# Patient Record
Sex: FEMALE | Race: WHITE | NOT HISPANIC OR LATINO | Employment: FULL TIME | URBAN - METROPOLITAN AREA
[De-identification: names, ages, dates, MRNs, and addresses within clinical notes are randomized per-mention and may not be internally consistent; named-entity substitution may affect disease eponyms.]

---

## 2017-11-16 ENCOUNTER — APPOINTMENT (OUTPATIENT)
Dept: PREADMISSION TESTING | Facility: HOSPITAL | Age: 57
End: 2017-11-16

## 2017-11-16 VITALS
SYSTOLIC BLOOD PRESSURE: 123 MMHG | HEIGHT: 69 IN | HEART RATE: 73 BPM | RESPIRATION RATE: 20 BRPM | TEMPERATURE: 98.4 F | DIASTOLIC BLOOD PRESSURE: 78 MMHG | OXYGEN SATURATION: 99 % | BODY MASS INDEX: 23.55 KG/M2 | WEIGHT: 159 LBS

## 2017-11-16 LAB
ALBUMIN SERPL-MCNC: 4.1 G/DL (ref 3.5–5.2)
ALBUMIN/GLOB SERPL: 1.5 G/DL
ALP SERPL-CCNC: 63 U/L (ref 39–117)
ALT SERPL W P-5'-P-CCNC: 21 U/L (ref 1–33)
ANION GAP SERPL CALCULATED.3IONS-SCNC: 9.6 MMOL/L
AST SERPL-CCNC: 20 U/L (ref 1–32)
BASOPHILS # BLD AUTO: 0.04 10*3/MM3 (ref 0–0.2)
BASOPHILS NFR BLD AUTO: 0.8 % (ref 0–1.5)
BILIRUB SERPL-MCNC: 0.5 MG/DL (ref 0.1–1.2)
BUN BLD-MCNC: 26 MG/DL (ref 6–20)
BUN/CREAT SERPL: 38.2 (ref 7–25)
CALCIUM SPEC-SCNC: 9.1 MG/DL (ref 8.6–10.5)
CHLORIDE SERPL-SCNC: 105 MMOL/L (ref 98–107)
CO2 SERPL-SCNC: 28.4 MMOL/L (ref 22–29)
CREAT BLD-MCNC: 0.68 MG/DL (ref 0.57–1)
DEPRECATED RDW RBC AUTO: 41.5 FL (ref 37–54)
EOSINOPHIL # BLD AUTO: 0.06 10*3/MM3 (ref 0–0.7)
EOSINOPHIL NFR BLD AUTO: 1.3 % (ref 0.3–6.2)
ERYTHROCYTE [DISTWIDTH] IN BLOOD BY AUTOMATED COUNT: 12.8 % (ref 11.7–13)
GFR SERPL CREATININE-BSD FRML MDRD: 89 ML/MIN/1.73
GLOBULIN UR ELPH-MCNC: 2.7 GM/DL
GLUCOSE BLD-MCNC: 72 MG/DL (ref 65–99)
HCT VFR BLD AUTO: 41 % (ref 35.6–45.5)
HGB BLD-MCNC: 13.8 G/DL (ref 11.9–15.5)
IMM GRANULOCYTES # BLD: 0.02 10*3/MM3 (ref 0–0.03)
IMM GRANULOCYTES NFR BLD: 0.4 % (ref 0–0.5)
LYMPHOCYTES # BLD AUTO: 1.17 10*3/MM3 (ref 0.9–4.8)
LYMPHOCYTES NFR BLD AUTO: 24.6 % (ref 19.6–45.3)
MCH RBC QN AUTO: 29.8 PG (ref 26.9–32)
MCHC RBC AUTO-ENTMCNC: 33.7 G/DL (ref 32.4–36.3)
MCV RBC AUTO: 88.6 FL (ref 80.5–98.2)
MONOCYTES # BLD AUTO: 0.23 10*3/MM3 (ref 0.2–1.2)
MONOCYTES NFR BLD AUTO: 4.8 % (ref 5–12)
NEUTROPHILS # BLD AUTO: 3.23 10*3/MM3 (ref 1.9–8.1)
NEUTROPHILS NFR BLD AUTO: 68.1 % (ref 42.7–76)
PLATELET # BLD AUTO: 205 10*3/MM3 (ref 140–500)
PMV BLD AUTO: 9.2 FL (ref 6–12)
POTASSIUM BLD-SCNC: 4.2 MMOL/L (ref 3.5–5.2)
PROT SERPL-MCNC: 6.8 G/DL (ref 6–8.5)
RBC # BLD AUTO: 4.63 10*6/MM3 (ref 3.9–5.2)
SODIUM BLD-SCNC: 143 MMOL/L (ref 136–145)
WBC NRBC COR # BLD: 4.75 10*3/MM3 (ref 4.5–10.7)

## 2017-11-16 PROCEDURE — 93010 ELECTROCARDIOGRAM REPORT: CPT | Performed by: INTERNAL MEDICINE

## 2017-11-16 PROCEDURE — 80053 COMPREHEN METABOLIC PANEL: CPT | Performed by: ORTHOPAEDIC SURGERY

## 2017-11-16 PROCEDURE — 36415 COLL VENOUS BLD VENIPUNCTURE: CPT

## 2017-11-16 PROCEDURE — 85025 COMPLETE CBC W/AUTO DIFF WBC: CPT | Performed by: ORTHOPAEDIC SURGERY

## 2017-11-16 PROCEDURE — 93005 ELECTROCARDIOGRAM TRACING: CPT

## 2017-11-16 RX ORDER — MONTELUKAST SODIUM 10 MG/1
10 TABLET ORAL EVERY MORNING
COMMUNITY

## 2017-11-16 RX ORDER — TRIAMCINOLONE ACETONIDE 55 UG/1
2 SPRAY, METERED NASAL EVERY MORNING
COMMUNITY

## 2017-11-16 RX ORDER — LEVOTHYROXINE SODIUM 0.03 MG/1
25 TABLET ORAL EVERY MORNING
COMMUNITY

## 2017-11-16 RX ORDER — MELOXICAM 15 MG/1
15 TABLET ORAL EVERY MORNING
COMMUNITY

## 2017-11-16 RX ORDER — ERGOCALCIFEROL 1.25 MG/1
50000 CAPSULE ORAL 2 TIMES WEEKLY
COMMUNITY

## 2017-11-16 NOTE — DISCHARGE INSTRUCTIONS
Take the following medications the morning of surgery with a small sip of water:  none        General Instructions:  • Do not eat solid food after midnight the night before surgery.  • You may drink clear liquids day of surgery but must stop at least one hour before your hospital arrival time.  • It is beneficial for you to have a clear drink that contains carbohydrates the day of surgery.  We suggest a 12 to 20 ounce bottle of Gatorade or Powerade for non-diabetic patients or a 12 to 20 ounce bottle of G2 or Powerade Zero for diabetic patients. (Pediatric patients, are not advised to drink a 12 to 20 ounce carbohydrate drink)    Clear liquids are liquids you can see through.  Nothing red in color.     Plain water                               Sports drinks  Sodas                                   Gelatin (Jell-O)  Fruit juices without pulp such as white grape juice and apple juice  Popsicles that contain no fruit or yogurt  Tea or coffee (no cream or milk added)  Gatorade / Powerade  G2 / Powerade Zero    • Infants may have breast milk up to four hours before surgery.  • Infants drinking formula may drink formula up to six hours before surgery.   • Patients who avoid smoking, chewing tobacco and alcohol for 4 weeks prior to surgery have a reduced risk of post-operative complications.  Quit smoking as many days before surgery as you can.  • Do not smoke, use chewing tobacco or drink alcohol the day of surgery.   • If applicable bring your C-PAP/ BI-PAP machine.  • Bring any papers given to you in the doctor’s office.  • Wear clean comfortable clothes and socks.  • Do not wear contact lenses or make-up.  Bring a case for your glasses.   • Bring crutches or walker if applicable.  • Remove all piercings.  Leave jewelry and any other valuables at home.  • The Pre-Admission Testing nurse will instruct you to bring medications if unable to obtain an accurate list in Pre-Admission Testing.        If you were given a blood  bank ID arm band remember to bring it with you the day of surgery.    Preventing a Surgical Site Infection:  • For 2 to 3 days before surgery, avoid shaving with a razor because the razor can irritate skin and make it easier to develop an infection.  • The night prior to surgery sleep in a clean bed with clean clothing.  Do not allow pets to sleep with you.  • Shower on the morning of surgery using a fresh bar of anti-bacterial soap (such as Dial) and clean washcloth.  Dry with a clean towel and dress in clean clothing.  • Ask your surgeon if you will be receiving antibiotics prior to surgery.  • Make sure you, your family, and all healthcare providers clean their hands with soap and water or an alcohol based hand  before caring for you or your wound.    Day of surgery:  Upon arrival, a Pre-op nurse and Anesthesiologist will review your health history, obtain vital signs, and answer questions you may have.  The only belongings needed at this time will be your home medications and if applicable your C-PAP/BI-PAP machine.  If you are staying overnight your family can leave the rest of your belongings in the car and bring them to your room later.  A Pre-op nurse will start an IV and you may receive medication in preparation for surgery, including something to help you relax.  Your family will be able to see you in the Pre-op area.  While you are in surgery your family should notify the waiting room  if they leave the waiting room area and provide a contact phone number.    Please be aware that surgery does come with discomfort.  We want to make every effort to control your discomfort so please discuss any uncontrolled symptoms with your nurse.   Your doctor will most likely have prescribed pain medications.      If you are going home after surgery you will receive individualized written care instructions before being discharged.  A responsible adult must drive you to and from the hospital on the  day of your surgery and stay with you for 24 hours.    If you are staying overnight following surgery, you will be transported to your hospital room following the recovery period.  [facility] has all private rooms.    If you have any questions please call Pre-Admission Testing at [phone].  Deductibles and co-payments are collected on the day of service. Please be prepared to pay the required co-pay, deductible or deposit on the day of service as defined by your plan.

## 2017-11-28 RX ORDER — CLINDAMYCIN PHOSPHATE 600 MG/50ML
600 INJECTION INTRAVENOUS EVERY 8 HOURS
Status: DISPENSED | OUTPATIENT
Start: 2017-11-30 | End: 2017-11-30

## 2017-11-30 ENCOUNTER — HOSPITAL ENCOUNTER (OUTPATIENT)
Facility: HOSPITAL | Age: 57
Setting detail: HOSPITAL OUTPATIENT SURGERY
Discharge: HOME | End: 2017-11-30
Attending: ORTHOPAEDIC SURGERY | Admitting: ORTHOPAEDIC SURGERY

## 2017-11-30 ENCOUNTER — ANESTHESIA EVENT (OUTPATIENT)
Dept: PERIOP | Facility: HOSPITAL | Age: 57
End: 2017-11-30

## 2017-11-30 ENCOUNTER — ANESTHESIA (OUTPATIENT)
Dept: PERIOP | Facility: HOSPITAL | Age: 57
End: 2017-11-30

## 2017-11-30 VITALS
HEART RATE: 77 BPM | DIASTOLIC BLOOD PRESSURE: 78 MMHG | OXYGEN SATURATION: 96 % | RESPIRATION RATE: 16 BRPM | TEMPERATURE: 97.9 F | SYSTOLIC BLOOD PRESSURE: 128 MMHG

## 2017-11-30 PROCEDURE — 25010000002 PROPOFOL 10 MG/ML EMULSION: Performed by: NURSE ANESTHETIST, CERTIFIED REGISTERED

## 2017-11-30 PROCEDURE — 25010000002 ONDANSETRON PER 1 MG: Performed by: NURSE ANESTHETIST, CERTIFIED REGISTERED

## 2017-11-30 PROCEDURE — 25010000002 FENTANYL CITRATE (PF) 100 MCG/2ML SOLUTION: Performed by: ANESTHESIOLOGY

## 2017-11-30 PROCEDURE — 25010000002 MIDAZOLAM PER 1 MG: Performed by: ANESTHESIOLOGY

## 2017-11-30 PROCEDURE — L3670 SO ACRO/CLAV CAN WEB PRE OTS: HCPCS | Performed by: ORTHOPAEDIC SURGERY

## 2017-11-30 PROCEDURE — 25010000002 DEXAMETHASONE PER 1 MG: Performed by: NURSE ANESTHETIST, CERTIFIED REGISTERED

## 2017-11-30 PROCEDURE — 25010000002 EPINEPHRINE PER 0.1 MG: Performed by: ORTHOPAEDIC SURGERY

## 2017-11-30 PROCEDURE — C1713 ANCHOR/SCREW BN/BN,TIS/BN: HCPCS | Performed by: ORTHOPAEDIC SURGERY

## 2017-11-30 PROCEDURE — 25010000002 ROPIVACAINE PER 1 MG: Performed by: ANESTHESIOLOGY

## 2017-11-30 DEVICE — SUT/ANCH BIOCOMP PUSH/LK 2.9X12.5MM: Type: IMPLANTABLE DEVICE | Site: SHOULDER | Status: FUNCTIONAL

## 2017-11-30 DEVICE — SUT/ANCH BIOCOMP SWIVELOCK TENODESIS 6.25X19.1: Type: IMPLANTABLE DEVICE | Site: SHOULDER | Status: FUNCTIONAL

## 2017-11-30 RX ORDER — ONDANSETRON 2 MG/ML
INJECTION INTRAMUSCULAR; INTRAVENOUS AS NEEDED
Status: DISCONTINUED | OUTPATIENT
Start: 2017-11-30 | End: 2017-11-30 | Stop reason: SURG

## 2017-11-30 RX ORDER — DEXAMETHASONE SODIUM PHOSPHATE 10 MG/ML
INJECTION INTRAMUSCULAR; INTRAVENOUS AS NEEDED
Status: DISCONTINUED | OUTPATIENT
Start: 2017-11-30 | End: 2017-11-30 | Stop reason: SURG

## 2017-11-30 RX ORDER — PROMETHAZINE HYDROCHLORIDE 25 MG/1
25 SUPPOSITORY RECTAL ONCE AS NEEDED
Status: DISCONTINUED | OUTPATIENT
Start: 2017-11-30 | End: 2017-11-30 | Stop reason: HOSPADM

## 2017-11-30 RX ORDER — FENTANYL CITRATE 50 UG/ML
50 INJECTION, SOLUTION INTRAMUSCULAR; INTRAVENOUS
Status: DISCONTINUED | OUTPATIENT
Start: 2017-11-30 | End: 2017-11-30 | Stop reason: HOSPADM

## 2017-11-30 RX ORDER — GLYCOPYRROLATE 0.2 MG/ML
INJECTION INTRAMUSCULAR; INTRAVENOUS AS NEEDED
Status: DISCONTINUED | OUTPATIENT
Start: 2017-11-30 | End: 2017-11-30 | Stop reason: SURG

## 2017-11-30 RX ORDER — FAMOTIDINE 10 MG/ML
20 INJECTION, SOLUTION INTRAVENOUS ONCE
Status: COMPLETED | OUTPATIENT
Start: 2017-11-30 | End: 2017-11-30

## 2017-11-30 RX ORDER — HYDROMORPHONE HYDROCHLORIDE 1 MG/ML
0.5 INJECTION, SOLUTION INTRAMUSCULAR; INTRAVENOUS; SUBCUTANEOUS
Status: DISCONTINUED | OUTPATIENT
Start: 2017-11-30 | End: 2017-11-30 | Stop reason: HOSPADM

## 2017-11-30 RX ORDER — PROMETHAZINE HYDROCHLORIDE 25 MG/ML
12.5 INJECTION, SOLUTION INTRAMUSCULAR; INTRAVENOUS ONCE AS NEEDED
Status: DISCONTINUED | OUTPATIENT
Start: 2017-11-30 | End: 2017-11-30 | Stop reason: HOSPADM

## 2017-11-30 RX ORDER — PROPOFOL 10 MG/ML
VIAL (ML) INTRAVENOUS AS NEEDED
Status: DISCONTINUED | OUTPATIENT
Start: 2017-11-30 | End: 2017-11-30 | Stop reason: SURG

## 2017-11-30 RX ORDER — SODIUM CHLORIDE 0.9 % (FLUSH) 0.9 %
1-10 SYRINGE (ML) INJECTION AS NEEDED
Status: DISCONTINUED | OUTPATIENT
Start: 2017-11-30 | End: 2017-11-30 | Stop reason: HOSPADM

## 2017-11-30 RX ORDER — LIDOCAINE HYDROCHLORIDE 20 MG/ML
INJECTION, SOLUTION INFILTRATION; PERINEURAL AS NEEDED
Status: DISCONTINUED | OUTPATIENT
Start: 2017-11-30 | End: 2017-11-30 | Stop reason: SURG

## 2017-11-30 RX ORDER — EPHEDRINE SULFATE 50 MG/ML
INJECTION, SOLUTION INTRAVENOUS AS NEEDED
Status: DISCONTINUED | OUTPATIENT
Start: 2017-11-30 | End: 2017-11-30 | Stop reason: SURG

## 2017-11-30 RX ORDER — MIDAZOLAM HYDROCHLORIDE 1 MG/ML
2 INJECTION INTRAMUSCULAR; INTRAVENOUS
Status: DISCONTINUED | OUTPATIENT
Start: 2017-11-30 | End: 2017-11-30 | Stop reason: HOSPADM

## 2017-11-30 RX ORDER — ONDANSETRON 2 MG/ML
4 INJECTION INTRAMUSCULAR; INTRAVENOUS ONCE AS NEEDED
Status: DISCONTINUED | OUTPATIENT
Start: 2017-11-30 | End: 2017-11-30 | Stop reason: HOSPADM

## 2017-11-30 RX ORDER — FLUMAZENIL 0.1 MG/ML
0.2 INJECTION INTRAVENOUS AS NEEDED
Status: DISCONTINUED | OUTPATIENT
Start: 2017-11-30 | End: 2017-11-30 | Stop reason: HOSPADM

## 2017-11-30 RX ORDER — LABETALOL HYDROCHLORIDE 5 MG/ML
5 INJECTION, SOLUTION INTRAVENOUS
Status: DISCONTINUED | OUTPATIENT
Start: 2017-11-30 | End: 2017-11-30 | Stop reason: HOSPADM

## 2017-11-30 RX ORDER — HYDRALAZINE HYDROCHLORIDE 20 MG/ML
5 INJECTION INTRAMUSCULAR; INTRAVENOUS
Status: DISCONTINUED | OUTPATIENT
Start: 2017-11-30 | End: 2017-11-30 | Stop reason: HOSPADM

## 2017-11-30 RX ORDER — DIPHENHYDRAMINE HYDROCHLORIDE 50 MG/ML
12.5 INJECTION INTRAMUSCULAR; INTRAVENOUS
Status: DISCONTINUED | OUTPATIENT
Start: 2017-11-30 | End: 2017-11-30 | Stop reason: HOSPADM

## 2017-11-30 RX ORDER — EPHEDRINE SULFATE 50 MG/ML
5 INJECTION, SOLUTION INTRAVENOUS ONCE AS NEEDED
Status: DISCONTINUED | OUTPATIENT
Start: 2017-11-30 | End: 2017-11-30 | Stop reason: HOSPADM

## 2017-11-30 RX ORDER — HYDROCODONE BITARTRATE AND ACETAMINOPHEN 7.5; 325 MG/1; MG/1
1 TABLET ORAL ONCE AS NEEDED
Status: DISCONTINUED | OUTPATIENT
Start: 2017-11-30 | End: 2017-11-30 | Stop reason: HOSPADM

## 2017-11-30 RX ORDER — PROMETHAZINE HYDROCHLORIDE 25 MG/1
25 TABLET ORAL ONCE AS NEEDED
Status: DISCONTINUED | OUTPATIENT
Start: 2017-11-30 | End: 2017-11-30 | Stop reason: HOSPADM

## 2017-11-30 RX ORDER — MIDAZOLAM HYDROCHLORIDE 1 MG/ML
1 INJECTION INTRAMUSCULAR; INTRAVENOUS
Status: DISCONTINUED | OUTPATIENT
Start: 2017-11-30 | End: 2017-11-30 | Stop reason: HOSPADM

## 2017-11-30 RX ORDER — OXYCODONE AND ACETAMINOPHEN 7.5; 325 MG/1; MG/1
1 TABLET ORAL ONCE AS NEEDED
Status: DISCONTINUED | OUTPATIENT
Start: 2017-11-30 | End: 2017-11-30 | Stop reason: HOSPADM

## 2017-11-30 RX ORDER — OXYCODONE HYDROCHLORIDE AND ACETAMINOPHEN 5; 325 MG/1; MG/1
1 TABLET ORAL EVERY 4 HOURS PRN
Qty: 36 TABLET | Refills: 0 | Status: SHIPPED | OUTPATIENT
Start: 2017-11-30

## 2017-11-30 RX ORDER — PROMETHAZINE HYDROCHLORIDE 25 MG/1
12.5 TABLET ORAL ONCE AS NEEDED
Status: DISCONTINUED | OUTPATIENT
Start: 2017-11-30 | End: 2017-11-30 | Stop reason: HOSPADM

## 2017-11-30 RX ORDER — ROPIVACAINE HYDROCHLORIDE 5 MG/ML
INJECTION, SOLUTION EPIDURAL; INFILTRATION; PERINEURAL AS NEEDED
Status: DISCONTINUED | OUTPATIENT
Start: 2017-11-30 | End: 2017-11-30 | Stop reason: SURG

## 2017-11-30 RX ORDER — SODIUM CHLORIDE, SODIUM LACTATE, POTASSIUM CHLORIDE, CALCIUM CHLORIDE 600; 310; 30; 20 MG/100ML; MG/100ML; MG/100ML; MG/100ML
9 INJECTION, SOLUTION INTRAVENOUS CONTINUOUS
Status: DISCONTINUED | OUTPATIENT
Start: 2017-11-30 | End: 2017-11-30 | Stop reason: HOSPADM

## 2017-11-30 RX ORDER — NALOXONE HCL 0.4 MG/ML
0.2 VIAL (ML) INJECTION AS NEEDED
Status: DISCONTINUED | OUTPATIENT
Start: 2017-11-30 | End: 2017-11-30 | Stop reason: HOSPADM

## 2017-11-30 RX ORDER — CLINDAMYCIN PHOSPHATE 600 MG/50ML
INJECTION INTRAVENOUS AS NEEDED
Status: DISCONTINUED | OUTPATIENT
Start: 2017-11-30 | End: 2017-11-30 | Stop reason: SURG

## 2017-11-30 RX ADMIN — FENTANYL CITRATE 50 MCG: 50 INJECTION INTRAMUSCULAR; INTRAVENOUS at 11:12

## 2017-11-30 RX ADMIN — ROPIVACAINE HYDROCHLORIDE 30 ML: 5 INJECTION, SOLUTION EPIDURAL; INFILTRATION; PERINEURAL at 11:30

## 2017-11-30 RX ADMIN — EPHEDRINE SULFATE 10 MG: 50 INJECTION INTRAMUSCULAR; INTRAVENOUS; SUBCUTANEOUS at 13:08

## 2017-11-30 RX ADMIN — MIDAZOLAM 2 MG: 1 INJECTION INTRAMUSCULAR; INTRAVENOUS at 11:12

## 2017-11-30 RX ADMIN — DEXAMETHASONE SODIUM PHOSPHATE 6 MG: 10 INJECTION INTRAMUSCULAR; INTRAVENOUS at 12:49

## 2017-11-30 RX ADMIN — PROPOFOL 200 MG: 10 INJECTION, EMULSION INTRAVENOUS at 12:34

## 2017-11-30 RX ADMIN — FAMOTIDINE 20 MG: 10 INJECTION, SOLUTION INTRAVENOUS at 10:30

## 2017-11-30 RX ADMIN — EPHEDRINE SULFATE 10 MG: 50 INJECTION INTRAMUSCULAR; INTRAVENOUS; SUBCUTANEOUS at 12:58

## 2017-11-30 RX ADMIN — SODIUM CHLORIDE, POTASSIUM CHLORIDE, SODIUM LACTATE AND CALCIUM CHLORIDE 9 ML/HR: 600; 310; 30; 20 INJECTION, SOLUTION INTRAVENOUS at 10:00

## 2017-11-30 RX ADMIN — CLINDAMYCIN PHOSPHATE 600 MG: 12 INJECTION, SOLUTION INTRAVENOUS at 12:40

## 2017-11-30 RX ADMIN — EPHEDRINE SULFATE 10 MG: 50 INJECTION INTRAMUSCULAR; INTRAVENOUS; SUBCUTANEOUS at 13:24

## 2017-11-30 RX ADMIN — LIDOCAINE HYDROCHLORIDE 60 MG: 20 INJECTION, SOLUTION INFILTRATION; PERINEURAL at 12:34

## 2017-11-30 RX ADMIN — GLYCOPYRROLATE 0.2 MG: 0.2 INJECTION INTRAMUSCULAR; INTRAVENOUS at 13:09

## 2017-11-30 RX ADMIN — ONDANSETRON 4 MG: 2 INJECTION INTRAMUSCULAR; INTRAVENOUS at 13:37

## 2017-11-30 RX ADMIN — SODIUM CHLORIDE, POTASSIUM CHLORIDE, SODIUM LACTATE AND CALCIUM CHLORIDE: 600; 310; 30; 20 INJECTION, SOLUTION INTRAVENOUS at 13:10

## 2017-11-30 NOTE — ANESTHESIA PROCEDURE NOTES
Airway  Urgency: elective    Airway not difficult    General Information and Staff    Patient location during procedure: OR  Anesthesiologist: DAYLIN WEI  CRNA: NICOLETTE MATTHEW    Indications and Patient Condition  Indications for airway management: airway protection    Preoxygenated: yes (Pt pre-O2 with 100% O2)  MILS maintained throughout  Mask difficulty assessment: 0 - not attempted    Final Airway Details  Final airway type: supraglottic airway      Successful airway: classic  Size 4    Number of attempts at approach: 1    Additional Comments  ATOLMA x1. No change in dentition. + ETCO2. Airway seal pressure <20cm H2O.

## 2017-11-30 NOTE — ANESTHESIA PREPROCEDURE EVALUATION
Anesthesia Evaluation     Patient summary reviewed and Nursing notes reviewed   history of anesthetic complications: PONV  NPO Solid Status: > 8 hours  NPO Liquid Status: > 2 hours     Airway   Mallampati: II  TM distance: >3 FB  Neck ROM: full  Dental - normal exam     Pulmonary - negative pulmonary ROS and normal exam   Cardiovascular - negative cardio ROS and normal exam        Neuro/Psych- negative ROS  GI/Hepatic/Renal/Endo - negative ROS     Musculoskeletal     (+) back pain,   Abdominal    Substance History      OB/GYN negative ob/gyn ROS         Other   (+) arthritis                                     Anesthesia Plan    ASA 2     general and regional     Anesthetic plan and risks discussed with patient.

## 2017-11-30 NOTE — ANESTHESIA PROCEDURE NOTES
Peripheral Block    Patient location during procedure: holding area  Reason for block: at surgeon's request and post-op pain management  Performed by  Anesthesiologist: DAYLIN WEI  Preanesthetic Checklist  Completed: patient identified, site marked, surgical consent, pre-op evaluation, timeout performed, IV checked, risks and benefits discussed and monitors and equipment checked  Prep:  Sterile barriers:cap, gloves and mask  Prep: ChloraPrep  Patient monitoring: blood pressure monitoring, continuous pulse oximetry and EKG  Procedure  Sedation:yes    Guidance:ultrasound guided  ULTRASOUND INTERPRETATION.  Using ultrasound guidance a 22 G gauge needle was placed in close proximity to the brachial plexus nerve, at which point, under ultrasound guidance anesthetic was injected in the area of the nerve and spread of the anesthesia was seen on ultrasound in close proximity thereto.  There were no abnormalities seen on ultrasound; a digital image was taken; and the patient tolerated the procedure with no complications. Images:still images obtained    Laterality:right  Block Type:interscalene  Injection Technique:single-shot  Needle Type:echogenic  Needle Gauge:21 G    Medications  Local Injected:ropivacaine 0.5% without epinephrine Local Amount Injected:30mL  Post Assessment  Injection Assessment: negative aspiration for heme, no paresthesia on injection and incremental injection  Patient Tolerance:comfortable throughout block  Complications:no  Additional Notes  Ultrasound Interpretation:  Using ultrasound guidance the needle was placed in close proximity to the brachial plexus and anesthetic was injected in the area of the brachial plexus and spread of the anesthetic was seen on ultrasound in close proximity thereto.  There were no abnormalities seen on ultrasound; a digital image was taken; and the patient tolerated the procedure with no complications.    Block placed for postoperative pain control per  surgeon request.

## 2017-11-30 NOTE — ANESTHESIA POSTPROCEDURE EVALUATION
Patient: Kori Encarnacion    Procedure Summary     Date Anesthesia Start Anesthesia Stop Room / Location    11/30/17 1230 1358 [facility] OR [facility] / [facility]       Procedure Diagnosis Surgeon Provider    RIGHT SHOULDER ARTHROSCOPY WITH BICEPS TENODESIS,  SLAP REPAIR (Right Shoulder) No diagnosis on file. MD Faizan Shea MD          Anesthesia Type: general, regional  Last vitals  BP   128/78 (11/30/17 1442)   Temp   36.6 °C (97.9 °F) (11/30/17 1442)   Pulse   77 (11/30/17 1442)   Resp   16 (11/30/17 1442)     SpO2   96 % (11/30/17 1442)     Post Anesthesia Care and Evaluation    Patient location during evaluation: bedside  Patient participation: complete - patient participated  Level of consciousness: awake  Pain score: 2  Pain management: adequate  Airway patency: patent  Anesthetic complications: No anesthetic complications  PONV Status: none  Cardiovascular status: acceptable  Respiratory status: acceptable  Hydration status: acceptable    Comments: /78 (BP Location: Left arm, Patient Position: Lying)  Pulse 77  Temp 36.6 °C (97.9 °F) (Oral)   Resp 16  SpO2 96%

## 2020-03-02 ENCOUNTER — TRANSCRIBE ORDERS (OUTPATIENT)
Dept: ADMINISTRATIVE | Facility: HOSPITAL | Age: 60
End: 2020-03-02

## 2020-03-02 DIAGNOSIS — R42 DIZZINESS: Primary | ICD-10-CM

## 2020-03-05 ENCOUNTER — HOSPITAL ENCOUNTER (OUTPATIENT)
Dept: CARDIOLOGY | Facility: HOSPITAL | Age: 60
Discharge: HOME | End: 2020-03-05
Admitting: FAMILY MEDICINE

## 2020-03-05 VITALS
HEIGHT: 69 IN | BODY MASS INDEX: 23.55 KG/M2 | DIASTOLIC BLOOD PRESSURE: 82 MMHG | SYSTOLIC BLOOD PRESSURE: 120 MMHG | WEIGHT: 159 LBS | HEART RATE: 75 BPM

## 2020-03-05 DIAGNOSIS — R42 DIZZINESS: ICD-10-CM

## 2020-03-05 LAB
AORTIC ROOT ANNULUS: 2.1 CM
ASCENDING AORTA: 2.9 CM
BH CV ECHO MEAS - ACS: 2.1 CM
BH CV ECHO MEAS - AO MAX PG (FULL): 1.6 MMHG
BH CV ECHO MEAS - AO MAX PG: 3.7 MMHG
BH CV ECHO MEAS - AO MEAN PG (FULL): 1 MMHG
BH CV ECHO MEAS - AO MEAN PG: 2.1 MMHG
BH CV ECHO MEAS - AO V2 MAX: 96.4 CM/SEC
BH CV ECHO MEAS - AO V2 MEAN: 68.3 CM/SEC
BH CV ECHO MEAS - AO V2 VTI: 21.1 CM
BH CV ECHO MEAS - ASC AORTA: 2.9 CM
BH CV ECHO MEAS - AVA(I,A): 2.3 CM^2
BH CV ECHO MEAS - AVA(I,D): 2.3 CM^2
BH CV ECHO MEAS - AVA(V,A): 2.5 CM^2
BH CV ECHO MEAS - AVA(V,D): 2.5 CM^2
BH CV ECHO MEAS - BSA(HAYCOCK): 1.9 M^2
BH CV ECHO MEAS - BSA: 1.9 M^2
BH CV ECHO MEAS - BZI_BMI: 23.5 KILOGRAMS/M^2
BH CV ECHO MEAS - BZI_METRIC_HEIGHT: 175.3 CM
BH CV ECHO MEAS - BZI_METRIC_WEIGHT: 72.1 KG
BH CV ECHO MEAS - EDV(MOD-SP2): 61 ML
BH CV ECHO MEAS - EDV(MOD-SP4): 70 ML
BH CV ECHO MEAS - EDV(TEICH): 85.1 ML
BH CV ECHO MEAS - EF(CUBED): 75 %
BH CV ECHO MEAS - EF(MOD-BP): 59 %
BH CV ECHO MEAS - EF(MOD-SP2): 57.4 %
BH CV ECHO MEAS - EF(MOD-SP4): 60 %
BH CV ECHO MEAS - EF(TEICH): 67.1 %
BH CV ECHO MEAS - ESV(MOD-SP2): 26 ML
BH CV ECHO MEAS - ESV(MOD-SP4): 28 ML
BH CV ECHO MEAS - ESV(TEICH): 28 ML
BH CV ECHO MEAS - FS: 37 %
BH CV ECHO MEAS - IVS/LVPW: 0.95
BH CV ECHO MEAS - IVSD: 1.1 CM
BH CV ECHO MEAS - LAT PEAK E' VEL: 6 CM/SEC
BH CV ECHO MEAS - LV DIASTOLIC VOL/BSA (35-75): 37.4 ML/M^2
BH CV ECHO MEAS - LV MASS(C)D: 180.6 GRAMS
BH CV ECHO MEAS - LV MASS(C)DI: 96.4 GRAMS/M^2
BH CV ECHO MEAS - LV MAX PG: 2.1 MMHG
BH CV ECHO MEAS - LV MEAN PG: 1.1 MMHG
BH CV ECHO MEAS - LV SYSTOLIC VOL/BSA (12-30): 14.9 ML/M^2
BH CV ECHO MEAS - LV V1 MAX: 73.3 CM/SEC
BH CV ECHO MEAS - LV V1 MEAN: 48.5 CM/SEC
BH CV ECHO MEAS - LV V1 VTI: 15.1 CM
BH CV ECHO MEAS - LVIDD: 4.3 CM
BH CV ECHO MEAS - LVIDS: 2.7 CM
BH CV ECHO MEAS - LVLD AP2: 7 CM
BH CV ECHO MEAS - LVLD AP4: 7.4 CM
BH CV ECHO MEAS - LVLS AP2: 6.2 CM
BH CV ECHO MEAS - LVLS AP4: 6.5 CM
BH CV ECHO MEAS - LVOT AREA (M): 3.1 CM^2
BH CV ECHO MEAS - LVOT AREA: 3.3 CM^2
BH CV ECHO MEAS - LVOT DIAM: 2 CM
BH CV ECHO MEAS - LVPWD: 1.2 CM
BH CV ECHO MEAS - MED PEAK E' VEL: 6 CM/SEC
BH CV ECHO MEAS - MV A DUR: 0.1 SEC
BH CV ECHO MEAS - MV A MAX VEL: 82.3 CM/SEC
BH CV ECHO MEAS - MV DEC SLOPE: 282.8 CM/SEC^2
BH CV ECHO MEAS - MV DEC TIME: 0.19 SEC
BH CV ECHO MEAS - MV E MAX VEL: 52.3 CM/SEC
BH CV ECHO MEAS - MV E/A: 0.64
BH CV ECHO MEAS - MV MAX PG: 2.7 MMHG
BH CV ECHO MEAS - MV MEAN PG: 1 MMHG
BH CV ECHO MEAS - MV P1/2T MAX VEL: 56.1 CM/SEC
BH CV ECHO MEAS - MV P1/2T: 58.1 MSEC
BH CV ECHO MEAS - MV V2 MAX: 81.4 CM/SEC
BH CV ECHO MEAS - MV V2 MEAN: 47.2 CM/SEC
BH CV ECHO MEAS - MV V2 VTI: 22.9 CM
BH CV ECHO MEAS - MVA P1/2T LCG: 3.9 CM^2
BH CV ECHO MEAS - MVA(P1/2T): 3.8 CM^2
BH CV ECHO MEAS - MVA(VTI): 2.2 CM^2
BH CV ECHO MEAS - PA ACC TIME: 0.16 SEC
BH CV ECHO MEAS - PA MAX PG (FULL): 0.08 MMHG
BH CV ECHO MEAS - PA MAX PG: 1.7 MMHG
BH CV ECHO MEAS - PA PR(ACCEL): 8.9 MMHG
BH CV ECHO MEAS - PA V2 MAX: 65.8 CM/SEC
BH CV ECHO MEAS - PULM A REVS DUR: 0.07 SEC
BH CV ECHO MEAS - PULM A REVS VEL: 25.7 CM/SEC
BH CV ECHO MEAS - PULM DIAS VEL: 38.1 CM/SEC
BH CV ECHO MEAS - PULM S/D: 1.1
BH CV ECHO MEAS - PULM SYS VEL: 42.8 CM/SEC
BH CV ECHO MEAS - PVA(V,A): 2.1 CM^2
BH CV ECHO MEAS - PVA(V,D): 2.1 CM^2
BH CV ECHO MEAS - QP/QS: 0.69
BH CV ECHO MEAS - RAP SYSTOLE: 8 MMHG
BH CV ECHO MEAS - RV MAX PG: 1.7 MMHG
BH CV ECHO MEAS - RV MEAN PG: 1 MMHG
BH CV ECHO MEAS - RV V1 MAX: 64.3 CM/SEC
BH CV ECHO MEAS - RV V1 MEAN: 48.6 CM/SEC
BH CV ECHO MEAS - RV V1 VTI: 15.8 CM
BH CV ECHO MEAS - RVOT AREA: 2.2 CM^2
BH CV ECHO MEAS - RVOT DIAM: 1.7 CM
BH CV ECHO MEAS - SI(CUBED): 32.8 ML/M^2
BH CV ECHO MEAS - SI(LVOT): 26.4 ML/M^2
BH CV ECHO MEAS - SI(MOD-SP2): 18.7 ML/M^2
BH CV ECHO MEAS - SI(MOD-SP4): 22.4 ML/M^2
BH CV ECHO MEAS - SI(TEICH): 30.5 ML/M^2
BH CV ECHO MEAS - SUP REN AO DIAM: 1.9 CM
BH CV ECHO MEAS - SV(CUBED): 61.4 ML
BH CV ECHO MEAS - SV(LVOT): 49.5 ML
BH CV ECHO MEAS - SV(MOD-SP2): 35 ML
BH CV ECHO MEAS - SV(MOD-SP4): 42 ML
BH CV ECHO MEAS - SV(RVOT): 34.4 ML
BH CV ECHO MEAS - SV(TEICH): 57.1 ML
BH CV ECHO MEAS - TAPSE (>1.6): 2.2 CM2
BH CV ECHO MEASUREMENTS AVERAGE E/E' RATIO: 8.72
BH CV XLRA - RV BASE: 2.9 CM
BH CV XLRA - RV LENGTH: 5.3 CM
BH CV XLRA - RV MID: 2.1 CM
BH CV XLRA - TDI S': 9 CM/SEC
LEFT ATRIUM VOLUME INDEX: 22 ML/M2
LV EF 2D ECHO EST: 59 %
SINUS: 3.2 CM
STJ: 2.5 CM

## 2020-03-05 PROCEDURE — 93306 TTE W/DOPPLER COMPLETE: CPT

## 2020-03-05 PROCEDURE — 93306 TTE W/DOPPLER COMPLETE: CPT | Performed by: INTERNAL MEDICINE

## 2024-05-10 PROBLEM — L81.9 HYPERPIGMENTATION: Status: ACTIVE | Noted: 2024-05-10

## 2024-05-10 PROBLEM — J30.2 SEASONAL ALLERGIES: Status: ACTIVE | Noted: 2024-05-10

## 2024-05-10 PROBLEM — I83.93 VARICOSE VEINS OF BOTH LOWER EXTREMITIES: Status: ACTIVE | Noted: 2024-05-10

## 2024-05-22 ENCOUNTER — HOSPITAL ENCOUNTER (OUTPATIENT)
Facility: HOSPITAL | Age: 64
Discharge: HOME | End: 2024-05-22
Admitting: NURSE PRACTITIONER
Payer: COMMERCIAL

## 2024-05-22 ENCOUNTER — OFFICE VISIT (OUTPATIENT)
Age: 64
End: 2024-05-22
Payer: COMMERCIAL

## 2024-05-22 VITALS
DIASTOLIC BLOOD PRESSURE: 80 MMHG | BODY MASS INDEX: 23.55 KG/M2 | SYSTOLIC BLOOD PRESSURE: 122 MMHG | HEIGHT: 69 IN | WEIGHT: 159 LBS

## 2024-05-22 DIAGNOSIS — M79.604 RIGHT LEG PAIN: ICD-10-CM

## 2024-05-22 DIAGNOSIS — M79.604 RIGHT LEG PAIN: Primary | ICD-10-CM

## 2024-05-22 LAB
BH CV LOW VAS RIGHT VARICOSITY BK VESSEL: 1
BH CV LOWER VASCULAR LEFT COMMON FEMORAL AUGMENT: NORMAL
BH CV LOWER VASCULAR LEFT COMMON FEMORAL COMPETENT: NORMAL
BH CV LOWER VASCULAR LEFT COMMON FEMORAL COMPRESS: NORMAL
BH CV LOWER VASCULAR LEFT COMMON FEMORAL PHASIC: NORMAL
BH CV LOWER VASCULAR LEFT COMMON FEMORAL SPONT: NORMAL
BH CV LOWER VASCULAR RIGHT COMMON FEMORAL AUGMENT: NORMAL
BH CV LOWER VASCULAR RIGHT COMMON FEMORAL COMPETENT: NORMAL
BH CV LOWER VASCULAR RIGHT COMMON FEMORAL COMPRESS: NORMAL
BH CV LOWER VASCULAR RIGHT COMMON FEMORAL PHASIC: NORMAL
BH CV LOWER VASCULAR RIGHT COMMON FEMORAL SPONT: NORMAL
BH CV LOWER VASCULAR RIGHT DISTAL FEMORAL COMPRESS: NORMAL
BH CV LOWER VASCULAR RIGHT GASTRONEMIUS COMPRESS: NORMAL
BH CV LOWER VASCULAR RIGHT GREATER SAPH AK COMPRESS: NORMAL
BH CV LOWER VASCULAR RIGHT GREATER SAPH BK COMPRESS: NORMAL
BH CV LOWER VASCULAR RIGHT LESSER SAPH COMPRESS: NORMAL
BH CV LOWER VASCULAR RIGHT MID FEMORAL AUGMENT: NORMAL
BH CV LOWER VASCULAR RIGHT MID FEMORAL COMPETENT: NORMAL
BH CV LOWER VASCULAR RIGHT MID FEMORAL COMPRESS: NORMAL
BH CV LOWER VASCULAR RIGHT MID FEMORAL PHASIC: NORMAL
BH CV LOWER VASCULAR RIGHT MID FEMORAL SPONT: NORMAL
BH CV LOWER VASCULAR RIGHT PERONEAL COMPRESS: NORMAL
BH CV LOWER VASCULAR RIGHT POPLITEAL AUGMENT: NORMAL
BH CV LOWER VASCULAR RIGHT POPLITEAL COMPETENT: NORMAL
BH CV LOWER VASCULAR RIGHT POPLITEAL COMPRESS: NORMAL
BH CV LOWER VASCULAR RIGHT POSTERIOR TIBIAL COMPRESS: NORMAL
BH CV LOWER VASCULAR RIGHT PROFUNDA FEMORAL COMPRESS: NORMAL
BH CV LOWER VASCULAR RIGHT PROXIMAL FEMORAL COMPRESS: NORMAL
BH CV LOWER VASCULAR RIGHT SAPHENOFEMORAL JUNCTION AUGMENT: NORMAL
BH CV LOWER VASCULAR RIGHT SAPHENOFEMORAL JUNCTION COMPETENT: NORMAL
BH CV LOWER VASCULAR RIGHT SAPHENOFEMORAL JUNCTION COMPRESS: NORMAL
BH CV LOWER VASCULAR RIGHT SOLEAL COMPRESS: NORMAL
BH CV LOWER VASCULAR RIGHT VARICOSITY BK COMPRESS: NORMAL
BH CV LOWER VASCULAR RIGHT VARICOSITY BK THROMBUS: NORMAL

## 2024-05-22 PROCEDURE — 93971 EXTREMITY STUDY: CPT

## 2024-05-22 RX ORDER — MECLIZINE HYDROCHLORIDE 25 MG/1
25 TABLET ORAL
COMMUNITY
Start: 2024-02-03

## 2024-05-22 RX ORDER — LISINOPRIL 10 MG/1
10 TABLET ORAL DAILY
COMMUNITY

## 2024-05-22 RX ORDER — ATORVASTATIN CALCIUM 10 MG/1
10 TABLET, FILM COATED ORAL DAILY
COMMUNITY

## 2024-05-22 NOTE — PROGRESS NOTES
"Chief Complaint  Follow-up (RLE ankle pain, discoloration, and knot under the skin. )    Subjective      History of Present Illness  Kori Encarnacion presents to [facility] VASCULAR SURGERY with complaints of new onset pain, redness, and warmth to her right medial distal calf.  She is concerned for DVT.    Past History:  Medical History: has a past medical history of Degenerative lumbar disc, Disease of thyroid gland, PONV (postoperative nausea and vomiting), Seasonal allergies, Shoulder pain, right, Spider veins (04/28/2016), Varicose veins of left lower extremity with inflammation (01/28/2016), Varicose veins of left lower extremity with inflammation (04/28/2016), and Venous insufficiency (2001).   Surgical History: has a past surgical history that includes Hysterectomy; Sinus surgery (1994); Varicose vein surgery (Bilateral); Meniscectomy; Shoulder arthroscopy w/ superior labral anterior posterior repair (Right, 11/30/2017); Laser ablation (11/04/2015); and Laser ablation (12/07/2015).   Family History: family history is not on file.   Social History: reports that she has never smoked. She has never used smokeless tobacco. She reports current alcohol use. She reports that she does not use drugs.    (Not in a hospital admission)     Allergies: Penicillins and Sulfa antibiotics     Kori Encarnacion  reports that she has never smoked. She has never used smokeless tobacco..       Objective   Vital Signs:  /80   Ht 175.3 cm (69\")   Wt 72.1 kg (159 lb)   BMI 23.48 kg/m²   Estimated body mass index is 23.48 kg/m² as calculated from the following:    Height as of this encounter: 175.3 cm (69\").    Weight as of this encounter: 72.1 kg (159 lb).     BMI is within normal parameters. No other follow-up for BMI required.      Physical Exam  Vitals reviewed.   Constitutional:       Appearance: Normal appearance. She is normal weight.   Cardiovascular:      Rate and Rhythm: Normal rate and regular " rhythm.   Pulmonary:      Breath sounds: Normal breath sounds.   Musculoskeletal:         General: Normal range of motion.      Right lower leg: Edema present.   Neurological:      General: No focal deficit present.      Mental Status: She is alert.        Venous:Telangiectasia  Reticular Vein(s)  CEAP Classification: 1    Result Review :                     Assessment and Plan     Diagnoses and all orders for this visit:    1. Right leg pain (Primary)  -     Duplex Venous Lower Extremity - Right CAR; Future    Kori Encarnacion is a 63 y.o. female been followed in our office by Dr. Barber for venous insufficiency with symptomatic varicose veins.  She has undergone bilateral EVLAs in 2015, followed by bilateral phlebectomies in January 2016.  She has since returned for injection sclerotherapy on an annual basis.  Her most recent treatment was 1/26/2024 in which Dr. Barber used 12 cc of 0.6% polidocanol to address bilateral lower extremity telangiectasia and reticular veins.  She returns today in an acute visit with complaints of new onset redness, warmth, pain and a knot to her right medial distal calf.  This began suddenly 2 weeks ago after she saw her osteopath.  She reports that he was putting pressure around the foot, ankle, and lower leg and a massaging motion.  The following day she had the symptoms.  She has been using cool compresses.  She does take meloxicam on a regular basis.  She has not been wearing compression stockings.  She reports her symptoms have improved only slightly.  She is very anxious and concerned about a blood clot.  She has no history of DVT or superficial thrombophlebitis.  She has a [place] flight in the next several weeks.  I will go ahead and get her set up for a right lower extremity venous duplex scan to be done in the office today.    Right lower extremity venous duplex scan was negative for DVT.  Did identify STP at the mid to distal calf.    We have again discussed the  progressive nature of venous insufficiency and the pathophysiology of superficial thrombophlebitis.  She was again measured for graded compression stockings.  We will treat with NSAIDs, compression, and cool compress.  Should signs and symptoms worsen or not resolved she is to call our office.  Otherwise we will see her as needed.         Follow Up     Return if symptoms worsen or fail to improve.  Patient was given instructions and counseling regarding her condition or for health maintenance advice. Please see specific information pulled into the AVS if appropriate.

## (undated) DEVICE — SPNG GZ WOVN 4X4IN 12PLY 10/BX STRL

## (undated) DEVICE — VULCAN GENERATOR ADAPTER,PACKAGED                                    3 PER BOX, STERILE. REQUIRED TO                                    OPERATE 7205962, PACKAGED                                    NON-STERILE, REUSABLE

## (undated) DEVICE — SYR LUERLOK 50ML

## (undated) DEVICE — KT POSTN ARM TRIMANO BEACH CHR W/DRP

## (undated) DEVICE — CANN TWST IN W/NOSQUIRT CAP 7IDX7CM

## (undated) DEVICE — GLV SURG BIOGEL LTX PF 8 1/2

## (undated) DEVICE — OCCLUSIVE GAUZE STRIP,3% BISMUTH TRIBROMOPHENATE IN PETROLATUM BLEND: Brand: XEROFORM

## (undated) DEVICE — SKIN PREP TRAY W/CHG: Brand: MEDLINE INDUSTRIES, INC.

## (undated) DEVICE — GLV SURG SENSICARE MICRO PF LF 6.5 STRL

## (undated) DEVICE — PK ARTHSCP SHLDR TOWER 40

## (undated) DEVICE — PAD,ABDOMINAL,8"X10",ST,LF: Brand: MEDLINE

## (undated) DEVICE — Device

## (undated) DEVICE — SUTURELASSO CRV 25D LT

## (undated) DEVICE — CANNULA THREADED DISP 8.5 X 72MM: Brand: CLEAR-TRAC

## (undated) DEVICE — GLV SURG BIOGEL LTX PF 8

## (undated) DEVICE — IMMOB SHLDR PAD2 UNIV SM

## (undated) DEVICE — TBG PUMP ARTHSCP MAIN AR6400 16FT

## (undated) DEVICE — SUT PROLN 4/0 FS2 18IN 8683G

## (undated) DEVICE — GLV SURG BIOGEL LTX PF 6 1/2